# Patient Record
Sex: MALE | Race: WHITE | NOT HISPANIC OR LATINO | URBAN - NONMETROPOLITAN AREA
[De-identification: names, ages, dates, MRNs, and addresses within clinical notes are randomized per-mention and may not be internally consistent; named-entity substitution may affect disease eponyms.]

---

## 2017-08-22 ENCOUNTER — OUTSIDE FACILITY SERVICE (OUTPATIENT)
Dept: CARDIOLOGY | Facility: CLINIC | Age: 67
End: 2017-08-22

## 2017-08-22 PROCEDURE — 93306 TTE W/DOPPLER COMPLETE: CPT | Performed by: INTERNAL MEDICINE

## 2017-08-22 PROCEDURE — 99222 1ST HOSP IP/OBS MODERATE 55: CPT | Performed by: NURSE PRACTITIONER

## 2017-08-23 ENCOUNTER — OUTSIDE FACILITY SERVICE (OUTPATIENT)
Dept: CARDIOLOGY | Facility: CLINIC | Age: 67
End: 2017-08-23

## 2017-08-23 PROCEDURE — 99232 SBSQ HOSP IP/OBS MODERATE 35: CPT | Performed by: INTERNAL MEDICINE

## 2017-08-24 ENCOUNTER — OUTSIDE FACILITY SERVICE (OUTPATIENT)
Dept: CARDIOLOGY | Facility: CLINIC | Age: 67
End: 2017-08-24

## 2017-08-24 PROCEDURE — 99232 SBSQ HOSP IP/OBS MODERATE 35: CPT | Performed by: INTERNAL MEDICINE

## 2017-08-25 ENCOUNTER — OUTSIDE FACILITY SERVICE (OUTPATIENT)
Dept: CARDIOLOGY | Facility: CLINIC | Age: 67
End: 2017-08-25

## 2017-08-25 PROCEDURE — 99232 SBSQ HOSP IP/OBS MODERATE 35: CPT | Performed by: NURSE PRACTITIONER

## 2017-08-28 ENCOUNTER — OUTSIDE FACILITY SERVICE (OUTPATIENT)
Dept: CARDIOLOGY | Facility: CLINIC | Age: 67
End: 2017-08-28

## 2017-08-28 PROCEDURE — 99232 SBSQ HOSP IP/OBS MODERATE 35: CPT | Performed by: NURSE PRACTITIONER

## 2017-08-29 ENCOUNTER — OUTSIDE FACILITY SERVICE (OUTPATIENT)
Dept: CARDIOLOGY | Facility: CLINIC | Age: 67
End: 2017-08-29

## 2017-08-29 PROCEDURE — 99231 SBSQ HOSP IP/OBS SF/LOW 25: CPT | Performed by: NURSE PRACTITIONER

## 2017-08-30 ENCOUNTER — OUTSIDE FACILITY SERVICE (OUTPATIENT)
Dept: CARDIOLOGY | Facility: CLINIC | Age: 67
End: 2017-08-30

## 2017-08-30 DIAGNOSIS — I50.22 CHRONIC SYSTOLIC HEART FAILURE (HCC): Primary | ICD-10-CM

## 2017-08-30 PROCEDURE — 99231 SBSQ HOSP IP/OBS SF/LOW 25: CPT | Performed by: NURSE PRACTITIONER

## 2017-09-06 ENCOUNTER — RESULTS ENCOUNTER (OUTPATIENT)
Dept: CARDIOLOGY | Facility: CLINIC | Age: 67
End: 2017-09-06

## 2017-09-06 DIAGNOSIS — I50.22 CHRONIC SYSTOLIC HEART FAILURE (HCC): ICD-10-CM

## 2017-09-11 ENCOUNTER — OFFICE VISIT (OUTPATIENT)
Dept: CARDIOLOGY | Facility: CLINIC | Age: 67
End: 2017-09-11

## 2017-09-11 VITALS
HEART RATE: 65 BPM | HEIGHT: 72 IN | WEIGHT: 246 LBS | BODY MASS INDEX: 33.32 KG/M2 | DIASTOLIC BLOOD PRESSURE: 70 MMHG | OXYGEN SATURATION: 95 % | SYSTOLIC BLOOD PRESSURE: 120 MMHG

## 2017-09-11 DIAGNOSIS — I10 ESSENTIAL HYPERTENSION: ICD-10-CM

## 2017-09-11 DIAGNOSIS — Z91.199 PATIENT NONADHERENCE: ICD-10-CM

## 2017-09-11 DIAGNOSIS — I50.22 CHRONIC SYSTOLIC CONGESTIVE HEART FAILURE (HCC): Primary | Chronic | ICD-10-CM

## 2017-09-11 DIAGNOSIS — E83.42 HYPOMAGNESEMIA: ICD-10-CM

## 2017-09-11 DIAGNOSIS — I48.0 PAROXYSMAL ATRIAL FIBRILLATION (HCC): ICD-10-CM

## 2017-09-11 DIAGNOSIS — Z72.0 TOBACCO ABUSE: ICD-10-CM

## 2017-09-11 PROCEDURE — 99214 OFFICE O/P EST MOD 30 MIN: CPT | Performed by: NURSE PRACTITIONER

## 2017-09-11 RX ORDER — METOPROLOL SUCCINATE 50 MG/1
25 TABLET, EXTENDED RELEASE ORAL 2 TIMES DAILY
Refills: 1 | COMMUNITY
Start: 2017-06-16 | End: 2018-02-15 | Stop reason: SDUPTHER

## 2017-09-11 RX ORDER — GABAPENTIN 300 MG/1
300 CAPSULE ORAL 3 TIMES DAILY
COMMUNITY

## 2017-09-11 RX ORDER — PANTOPRAZOLE SODIUM 40 MG/1
40 TABLET, DELAYED RELEASE ORAL DAILY
Qty: 30 TABLET | Refills: 2 | Status: SHIPPED | OUTPATIENT
Start: 2017-09-11 | End: 2018-02-15

## 2017-09-11 RX ORDER — NAPROXEN SODIUM 220 MG
220 TABLET ORAL 2 TIMES DAILY WITH MEALS
COMMUNITY
End: 2017-11-29

## 2017-09-11 RX ORDER — FUROSEMIDE 40 MG/1
40 TABLET ORAL DAILY
Qty: 60 TABLET | Refills: 11
Start: 2017-09-11 | End: 2018-09-11

## 2017-09-11 RX ORDER — ACETAMINOPHEN 500 MG
500 TABLET ORAL EVERY 6 HOURS PRN
COMMUNITY

## 2017-09-11 RX ORDER — ALBUTEROL SULFATE 90 UG/1
2 AEROSOL, METERED RESPIRATORY (INHALATION) TAKE AS DIRECTED
Refills: 1 | COMMUNITY
Start: 2017-08-15

## 2017-09-11 RX ORDER — POTASSIUM CHLORIDE 750 MG/1
10 TABLET, FILM COATED, EXTENDED RELEASE ORAL 2 TIMES DAILY
Refills: 0 | COMMUNITY
Start: 2017-08-30 | End: 2020-12-28

## 2017-09-11 RX ORDER — LISINOPRIL 2.5 MG/1
1.25 TABLET ORAL DAILY
Refills: 0 | COMMUNITY
Start: 2017-08-30 | End: 2017-11-29 | Stop reason: SDUPTHER

## 2017-09-11 NOTE — PROGRESS NOTES
Subjective:     Encounter Date:09/11/2017    Chief Complaint:    Patient ID: Derek Sanchez is a 66 y.o. male here today for cardiac hospital follow-up.    HPI     Congestive Heart Failure    Additional comments: hospitalized with acute heart failure last month, starting to have more shortness of breath later in the day similar to what he had before, hasn't been wearing LifeVest since he showered due to soft alarms while trying to go to sleep and hasn't put it back on, has been having trouble with eating low sodium foods and didn't  any of his new medications for a few days after discharge, stopped all of his new medications due to stomach getting upset, wore oxygen at home for a few days           Atrial Fibrillation    Additional comments: new onset 8/2017, paroxysmal, had been anticoagulated with Eliquis since 8/22/2017 hospitalization but has only taken once since getting prescriptions filled last dose approximately 9/2/2017       Last edited by LIO Cary on 9/11/2017  2:31 PM. (History)        History:   Past Medical History:   Diagnosis Date   • Atrial fibrillation    • CHF (congestive heart failure)    • Chronic kidney disease     kidney stone   • COPD (chronic obstructive pulmonary disease)    • Diabetes mellitus    • Hypertension    • SOB (shortness of breath)      Past Surgical History:   Procedure Laterality Date   • LEG SURGERY  05/2010     Social History     Social History   • Marital status: Single     Spouse name: N/A   • Number of children: N/A   • Years of education: N/A     Occupational History   • Not on file.     Social History Main Topics   • Smoking status: Current Every Day Smoker     Packs/day: 0.25     Types: Cigarettes   • Smokeless tobacco: Never Used   • Alcohol use Yes      Comment: occ   • Drug use: No   • Sexual activity: Not on file     Other Topics Concern   • Not on file     Social History Narrative   • No narrative on file     Family History   Problem  Relation Age of Onset   • Hypertension Mother    • Irregular heart beat Mother    • Dementia Mother    • Alcohol abuse Father    • Diabetes Father        Outpatient Prescriptions Marked as Taking for the 9/11/17 encounter (Office Visit) with LIO Cary   Medication Sig Dispense Refill   • acetaminophen (TYLENOL) 500 MG tablet Take 500 mg by mouth Every 6 (Six) Hours As Needed for Mild Pain .     • aspirin 81 MG tablet Take 81 mg by mouth Every Night.     • gabapentin (NEURONTIN) 300 MG capsule Take 300 mg by mouth 3 (Three) Times a Day.     • lisinopril (PRINIVIL,ZESTRIL) 2.5 MG tablet Take 1.25 mg by mouth Daily. 1/2 tablet daily  0   • metFORMIN (GLUCOPHAGE) 500 MG tablet Take 250 mg by mouth 2 (Two) Times a Day With Meals.     • metoprolol succinate XL (TOPROL-XL) 50 MG 24 hr tablet Take 25 mg by mouth 2 (Two) Times a Day.  1   • naproxen sodium (ALEVE) 220 MG tablet Take 220 mg by mouth 2 (Two) Times a Day With Meals.     • VENTOLIN  (90 Base) MCG/ACT inhaler Inhale 2 puffs Take As Directed.  1   Hasn't been taking Eliquis, furosemide, potassium, Celexa, MagOx, or Breo Ellipta as prescribed at hospital discharge 8/31/17. Restarted Aleve on his own.       Review of Systems:  Review of Systems   Constitution: Positive for decreased appetite, malaise/fatigue and weight gain (hasn't been weighing at home - doesn't have a set of scales, weighed 222 lbs at discharge 8/31/17). Negative for chills and fever.   HENT: Positive for congestion. Negative for headaches and hoarse voice.    Eyes: Negative for blurred vision (wears glasses) and double vision.   Cardiovascular: Positive for dyspnea on exertion, leg swelling ( has stayed down since being diuresed), orthopnea and paroxysmal nocturnal dyspnea. Negative for chest pain, irregular heartbeat and palpitations.   Respiratory: Positive for shortness of breath. Negative for cough.    Endocrine: Positive for heat intolerance. Negative for cold  "intolerance.   Hematologic/Lymphatic: Bruises/bleeds easily.   Skin: Negative for dry skin, itching and rash.   Musculoskeletal: Positive for back pain and muscle cramps. Negative for joint pain and neck pain.   Gastrointestinal: Positive for abdominal pain, constipation and diarrhea. Negative for heartburn and melena.   Genitourinary: Negative for dysuria, frequency and hematuria.   Neurological: Positive for dizziness, light-headedness and loss of balance.   Psychiatric/Behavioral: Negative for depression. The patient has insomnia. The patient is not nervous/anxious.           Objective:   /70 (BP Location: Left arm, Patient Position: Sitting, Cuff Size: Large Adult)  Pulse 65  Ht 72\" (182.9 cm)  Wt 246 lb (112 kg)  SpO2 95%  BMI 33.36 kg/m2  Wt Readings from Last 3 Encounters:   09/11/17 246 lb (112 kg)       Physical Exam   Constitutional: He is oriented to person, place, and time. He appears well-developed and well-nourished.   Eyes: No scleral icterus.   Neck: No JVD present.   Cardiovascular: Normal rate, regular rhythm, normal heart sounds and intact distal pulses.  Exam reveals no gallop and no friction rub.    No murmur heard.  Pulmonary/Chest: Effort normal and breath sounds normal.   Musculoskeletal: He exhibits edema (2+ pitting BLEs to below knee).   Neurological: He is alert and oriented to person, place, and time.   Skin: Skin is warm and dry.   Psychiatric: He has a normal mood and affect.   Vitals reviewed.      Lab/Diagnostics Review:   8/28/2017   CBC WBC 7.8, Hgb 16, Hct 49.7, plt 157,000  BMP Na 139, K 3.7, Cl 95, CO2 43.3, BUN 39, Cr 1.07, Ca 9.1, glucose 88    8/22/2017 2D Echocardiogram  EF 25-30%, mild MR, mild RVE, mild BEATRIZ, moderate TR, mild PI, RVSP 50-55 mm Hg     8/21/2017 CXR moderate right pleural effusion, opacifications in RML and RLL which may represent a combination of atelectasis and pneumonia. Mild cardiomegaly.     8/21/2017   Mg 1.7  NT pro BnP 5837  TSH " 2.664    8/21/2017 EKG atrial fibrillation 100 bpm, marked LAD, LBBB    Procedures: none in office today        Assessment/Plan:         Derek was seen today for congestive heart failure and atrial fibrillation.    Diagnoses and all orders for this visit:    Chronic systolic congestive heart failure  Comments:  Class III, Stage C. Restart furosemide, potassium, and MagOx. He doesn't want to do heart cath - explained importance of ruling out CAD.   Orders:  -     furosemide (LASIX) 40 MG tablet; Take 1 tablet by mouth Daily. With an extra tab as needed  -     Basic Metabolic Panel; Future    Paroxysmal atrial fibrillation  Comments:  restart Eliquis, explained risk of stroke without anticoagulation    Hypomagnesemia  Comments:  Restart MagOx - explained importance given weak heart muscle  Orders:  -     magnesium oxide (MAGOX 400) 400 (241.3 Mg) MG tablet tablet; Take 1 tablet by mouth 2 (Two) Times a Day.    Essential hypertension  Comments:  well controlled    Patient nonadherence  Comments:  encouraged adherence, discussed importance of medications, some socioeconomic barriers    Tobacco abuse  Comments:  will discuss smoking cessation in the future after deal with acute problems    Other orders  -     pantoprazole (PROTONIX) 40 MG EC tablet; Take 1 tablet by mouth Daily.    Resume furosemide 40 mg daily (with extra tab as needed), potassium 10 mEq 2 times per day, Eliquis 5 mg 2 times per day, and MagOx 400 mg 2 times per day. Discussed importance of medications and relationship to fluid, breathing, worsening of his heart function, etc.     Call if wants to proceed with Lexiscan stress test or cardiac catheterization. Will only consider cardioversion if he complies with Eliquis.     He is fearful about having the same reaction to an anesthesia or pain medication he had in the past at [facility] where he had black outs and was combative.  Reassured him we would make sure he didn't receive the same medication, but  couldn't guarantee he wouldn't have a similar reaction to other medications.     Will have him check BMP in 1 week since he hasn't been taking his medications but is now agreeable.     Return in about 1 month (around 10/11/2017) for Heart Failure Clinic. Sooner if needed.    Encouraged him to call if has any problems or questions with symptoms or medications.            LIO Louise, ACNP-BC, CHFN-BC

## 2017-09-18 ENCOUNTER — RESULTS ENCOUNTER (OUTPATIENT)
Dept: CARDIOLOGY | Facility: CLINIC | Age: 67
End: 2017-09-18

## 2017-09-18 DIAGNOSIS — I50.22 CHRONIC SYSTOLIC CONGESTIVE HEART FAILURE (HCC): Chronic | ICD-10-CM

## 2017-11-09 ENCOUNTER — OUTSIDE FACILITY SERVICE (OUTPATIENT)
Dept: CARDIOLOGY | Facility: CLINIC | Age: 67
End: 2017-11-09

## 2017-11-09 PROCEDURE — 99223 1ST HOSP IP/OBS HIGH 75: CPT | Performed by: INTERNAL MEDICINE

## 2017-11-09 PROCEDURE — 93306 TTE W/DOPPLER COMPLETE: CPT | Performed by: INTERNAL MEDICINE

## 2017-11-10 ENCOUNTER — OUTSIDE FACILITY SERVICE (OUTPATIENT)
Dept: CARDIOLOGY | Facility: CLINIC | Age: 67
End: 2017-11-10

## 2017-11-10 PROCEDURE — 99232 SBSQ HOSP IP/OBS MODERATE 35: CPT | Performed by: NURSE PRACTITIONER

## 2017-11-13 ENCOUNTER — OUTSIDE FACILITY SERVICE (OUTPATIENT)
Dept: CARDIOLOGY | Facility: CLINIC | Age: 67
End: 2017-11-13

## 2017-11-13 PROCEDURE — 99232 SBSQ HOSP IP/OBS MODERATE 35: CPT | Performed by: NURSE PRACTITIONER

## 2017-11-14 ENCOUNTER — OUTSIDE FACILITY SERVICE (OUTPATIENT)
Dept: CARDIOLOGY | Facility: CLINIC | Age: 67
End: 2017-11-14

## 2017-11-14 PROCEDURE — 99232 SBSQ HOSP IP/OBS MODERATE 35: CPT | Performed by: NURSE PRACTITIONER

## 2017-11-15 ENCOUNTER — OUTSIDE FACILITY SERVICE (OUTPATIENT)
Dept: CARDIOLOGY | Facility: CLINIC | Age: 67
End: 2017-11-15

## 2017-11-15 PROCEDURE — 99232 SBSQ HOSP IP/OBS MODERATE 35: CPT | Performed by: INTERNAL MEDICINE

## 2017-11-16 ENCOUNTER — OUTSIDE FACILITY SERVICE (OUTPATIENT)
Dept: CARDIOLOGY | Facility: CLINIC | Age: 67
End: 2017-11-16

## 2017-11-16 PROCEDURE — 99231 SBSQ HOSP IP/OBS SF/LOW 25: CPT | Performed by: INTERNAL MEDICINE

## 2017-11-20 ENCOUNTER — TELEPHONE (OUTPATIENT)
Dept: CARDIOLOGY | Facility: CLINIC | Age: 67
End: 2017-11-20

## 2017-11-20 NOTE — TELEPHONE ENCOUNTER
"Returned pt's call - he is questioning taking spironolactone and citalopram.  Advised him to keep taking spironolactone along with furosemide.  Swelling improving.  Weighing daily and has lost 3 lbs since discharge.  Home Health following.    He is to call [facility] for refills on citalopram - has appt to see Dr. Fowler 12/21/17.      Advised to watch \"dark skin\" on toe - almost black.  Hasn't changed in color or size since in the hospital but he doesn't know for sure.  Call Dr. Potts if worsens.  Will see Dr. Potts 11/28 and Dr. Menjivar 11/29.    "

## 2017-11-29 ENCOUNTER — OFFICE VISIT (OUTPATIENT)
Dept: CARDIOLOGY | Facility: CLINIC | Age: 67
End: 2017-11-29

## 2017-11-29 VITALS
HEART RATE: 62 BPM | HEIGHT: 72 IN | DIASTOLIC BLOOD PRESSURE: 70 MMHG | SYSTOLIC BLOOD PRESSURE: 120 MMHG | BODY MASS INDEX: 31.15 KG/M2 | OXYGEN SATURATION: 94 % | WEIGHT: 230 LBS

## 2017-11-29 DIAGNOSIS — I48.19 PERSISTENT ATRIAL FIBRILLATION (HCC): ICD-10-CM

## 2017-11-29 DIAGNOSIS — I10 ESSENTIAL HYPERTENSION: ICD-10-CM

## 2017-11-29 DIAGNOSIS — Z72.0 TOBACCO ABUSE: ICD-10-CM

## 2017-11-29 DIAGNOSIS — I50.22 CHRONIC SYSTOLIC CONGESTIVE HEART FAILURE (HCC): Primary | ICD-10-CM

## 2017-11-29 DIAGNOSIS — Z91.199 NONCOMPLIANCE: ICD-10-CM

## 2017-11-29 PROCEDURE — 99214 OFFICE O/P EST MOD 30 MIN: CPT | Performed by: INTERNAL MEDICINE

## 2017-11-29 RX ORDER — CITALOPRAM 10 MG/1
10 TABLET ORAL DAILY
Refills: 2 | COMMUNITY
Start: 2017-11-21

## 2017-11-29 RX ORDER — HYDROCODONE BITARTRATE AND ACETAMINOPHEN 7.5; 325 MG/1; MG/1
7.5-325 TABLET ORAL EVERY 6 HOURS PRN
Refills: 0 | COMMUNITY
Start: 2017-11-18 | End: 2020-07-22

## 2017-11-29 RX ORDER — SPIRONOLACTONE 25 MG/1
25 TABLET ORAL DAILY
Refills: 3 | COMMUNITY
Start: 2017-11-18 | End: 2019-12-23 | Stop reason: SDUPTHER

## 2017-11-29 RX ORDER — LISINOPRIL 2.5 MG/1
2.5 TABLET ORAL DAILY
Qty: 30 TABLET | Refills: 11 | Status: SHIPPED | OUTPATIENT
Start: 2017-11-29 | End: 2018-05-16 | Stop reason: SDUPTHER

## 2017-11-29 NOTE — PROGRESS NOTES
Reason for Visit: cardiovascular follow up.    HPI:  Derek Sanchez is a 67 y.o. male is here today for follow-up.  He was recently admitted to [facility] with acute on chronic congestive heart failure and leg ulcer.  Noncompliance was felt to be a major part of the patient's decompensated heart failure.  He has been compliant with his medications since discharge.  Feels like he is doing somewhat better.  Breathing has improved and swelling is at baseline.  Home health has been coming to his house.  He continues to smoke about 1/4 PPD.      Previous Cardiac Testing and Procedures:  - Echo (08/22/2017) EF 25-30%, mild MR, mild RV dilation, mild RA dilation, moderate TR, mild PI, RVSP 50-55 mmHg  - EKG (08/21/2017) atrial fibrillation with heart rate 100 bpm, left axis deviation, left bundle branch block  - Echo (11/09/2017) EF 20-25%, moderate RV dilatation, moderate reduced RV function, mild AI and MR, moderate TR, mild to moderate PI, RVSP 40-45 mmHg    Patient Active Problem List   Diagnosis   • Atrial fibrillation   • Hypertension   • COPD (chronic obstructive pulmonary disease)   • SOB (shortness of breath)   • CHF (congestive heart failure)   • Hypomagnesemia   • Tobacco abuse       Social History   Substance Use Topics   • Smoking status: Current Every Day Smoker     Packs/day: 0.25     Types: Cigarettes   • Smokeless tobacco: Never Used   • Alcohol use Yes      Comment: occ       Family History   Problem Relation Age of Onset   • Hypertension Mother    • Irregular heart beat Mother    • Dementia Mother    • Alcohol abuse Father    • Diabetes Father        The following portions of the patient's history were reviewed and updated as appropriate: allergies, current medications, past family history, past medical history, past social history, past surgical history and problem list.      Current Outpatient Prescriptions:   •  acetaminophen (TYLENOL) 500 MG tablet, Take 500 mg by mouth Every 6  (Six) Hours As Needed for Mild Pain ., Disp: , Rfl:   •  apixaban (ELIQUIS) 5 MG tablet tablet, Take 1 tablet by mouth 2 (Two) Times a Day., Disp: 60 tablet, Rfl: 11  •  aspirin 81 MG tablet, Take 81 mg by mouth Every Night., Disp: , Rfl:   •  BREO ELLIPTA 100-25 MCG/INH aerosol powder , Inhale 100 mcg Daily., Disp: , Rfl: 1  •  citalopram (CeleXA) 10 MG tablet, Take 10 mg by mouth Daily., Disp: , Rfl: 2  •  furosemide (LASIX) 40 MG tablet, Take 1 tablet by mouth Daily. With an extra tab as needed (Patient taking differently: Take 40 mg by mouth 2 (Two) Times a Day. With an extra tab as needed), Disp: 60 tablet, Rfl: 11  •  gabapentin (NEURONTIN) 300 MG capsule, Take 300 mg by mouth 3 (Three) Times a Day., Disp: , Rfl:   •  HYDROcodone-acetaminophen (NORCO) 7.5-325 MG per tablet, Take 7.5-325 tablets by mouth Every 6 (Six) Hours As Needed., Disp: , Rfl: 0  •  KLOR-CON 10 MEQ CR tablet, Take 10 mEq by mouth 2 (Two) Times a Day., Disp: , Rfl: 0  •  lisinopril (PRINIVIL,ZESTRIL) 2.5 MG tablet, Take 1 tablet by mouth Daily. 1/2 tablet daily, Disp: 30 tablet, Rfl: 11  •  magnesium oxide (MAGOX 400) 400 (241.3 Mg) MG tablet tablet, Take 1 tablet by mouth 2 (Two) Times a Day., Disp: 60 tablet, Rfl: 11  •  metFORMIN (GLUCOPHAGE) 500 MG tablet, Take 250 mg by mouth 2 (Two) Times a Day With Meals., Disp: , Rfl:   •  metoprolol succinate XL (TOPROL-XL) 50 MG 24 hr tablet, Take 25 mg by mouth 2 (Two) Times a Day., Disp: , Rfl: 1  •  pantoprazole (PROTONIX) 40 MG EC tablet, Take 1 tablet by mouth Daily., Disp: 30 tablet, Rfl: 2  •  silver sulfadiazine (SILVADENE, SSD) 1 % cream, Apply 1 g topically Daily., Disp: , Rfl: 3  •  spironolactone (ALDACTONE) 25 MG tablet, Take 25 mg by mouth Daily., Disp: , Rfl: 3  •  VENTOLIN  (90 Base) MCG/ACT inhaler, Inhale 2 puffs Take As Directed., Disp: , Rfl: 1    Review of Systems   Constitution: Positive for decreased appetite, malaise/fatigue and weight gain (hasn't been weighing at  "home - doesn't have a set of scales, weighed 222 lbs at discharge 8/31/17). Negative for chills and fever.   HENT: Positive for congestion. Negative for hoarse voice.    Eyes: Negative for blurred vision (wears glasses) and double vision.   Cardiovascular: Positive for dyspnea on exertion, leg swelling ( has stayed down since being diuresed), orthopnea and paroxysmal nocturnal dyspnea. Negative for chest pain, irregular heartbeat and palpitations.   Respiratory: Positive for shortness of breath. Negative for cough.    Endocrine: Positive for heat intolerance. Negative for cold intolerance.   Hematologic/Lymphatic: Bruises/bleeds easily.   Skin: Negative for dry skin, itching and rash.   Musculoskeletal: Positive for back pain and muscle cramps. Negative for joint pain and neck pain.   Gastrointestinal: Positive for abdominal pain, constipation and diarrhea. Negative for heartburn and melena.   Genitourinary: Negative for dysuria, frequency and hematuria.   Neurological: Positive for dizziness, light-headedness and loss of balance. Negative for headaches.   Psychiatric/Behavioral: Negative for depression. The patient has insomnia. The patient is not nervous/anxious.        Objective   /70 (BP Location: Left arm, Patient Position: Sitting, Cuff Size: Adult)  Pulse 62  Ht 72\" (182.9 cm)  Wt 230 lb (104 kg)  SpO2 94%  BMI 31.19 kg/m2  Physical Exam   Constitutional: He is oriented to person, place, and time. He appears well-developed and well-nourished.   HENT:   Head: Normocephalic and atraumatic.   Cardiovascular: Normal rate and normal heart sounds.  An irregularly irregular rhythm present.   No murmur heard.  Pulmonary/Chest: Effort normal and breath sounds normal.   Musculoskeletal: He exhibits edema.   Neurological: He is alert and oriented to person, place, and time.   Skin: Skin is warm and dry.   Psychiatric: He has a normal mood and affect.     Procedures      ICD-10-CM ICD-9-CM   1. Chronic " systolic congestive heart failure I50.22 428.22     428.0   2. Persistent atrial fibrillation I48.1 427.31   3. Essential hypertension I10 401.9   4. Tobacco abuse Z72.0 305.1   5. Noncompliance Z91.19 V15.81         Assessment/Plan:  1. Chronic systolic congestive heart failure: Recent inpatient admission for acute decompensation.  EF 20-25%.  Noncompliance is felt to be playing a role.  Patient had previously refused any form of ischemic workup.  Titrate up lisinopril to 2.5 mg.  Continue metoprolol succinate, spironolactone, and furosemide.  Recommend to avoid NSAIDS.  Has repeat  BMP scheduled with Dr Fowler at follow up.      2.  Atrial fibrillation: Rate controlled on metoprolol and on anticoagulation with Eliquis.    3.  Essential hypertension: Blood pressure is acceptable today.    4.  Tobacco abuse:  on the importance of smoking cessation and options for quitting.    5.  Noncompliance:  on the importance of taking all medicines and keep all follow-up appointments.  Also counseled patient on the dangers and risks associated with noncompliance.

## 2018-01-05 RX ORDER — PANTOPRAZOLE SODIUM 40 MG/1
40 TABLET, DELAYED RELEASE ORAL DAILY
Qty: 30 TABLET | Refills: 11 | OUTPATIENT
Start: 2018-01-05 | End: 2019-01-05

## 2018-01-05 NOTE — TELEPHONE ENCOUNTER
I gave him a few months of protonix in Sept but needs to disuss with PCP if feels like needs further refills

## 2018-01-31 RX ORDER — PANTOPRAZOLE SODIUM 40 MG/1
40 TABLET, DELAYED RELEASE ORAL DAILY
Qty: 30 TABLET | Refills: 11 | OUTPATIENT
Start: 2018-01-31 | End: 2019-01-31

## 2018-01-31 NOTE — TELEPHONE ENCOUNTER
He needs to check with Dr. Fowler about whether or not to continue Protonix.  I refilled Eliquis.  He may need prior auth or pt assistance. Ok to get samples until we know his coverage if needed.

## 2018-01-31 NOTE — TELEPHONE ENCOUNTER
NEEDS REFILLS ON PANTOPRAZOLE 40MG.  HE IS ALSO LOW ON ELIQUIS AND IS NEEDING EITHER A SCRIPT FOR IT OR MORE SAMPLES.

## 2018-02-15 ENCOUNTER — OFFICE VISIT (OUTPATIENT)
Dept: CARDIOLOGY | Facility: CLINIC | Age: 68
End: 2018-02-15

## 2018-02-15 VITALS
WEIGHT: 230 LBS | BODY MASS INDEX: 31.15 KG/M2 | HEIGHT: 72 IN | SYSTOLIC BLOOD PRESSURE: 116 MMHG | OXYGEN SATURATION: 95 % | DIASTOLIC BLOOD PRESSURE: 80 MMHG | HEART RATE: 95 BPM

## 2018-02-15 DIAGNOSIS — E66.09 CLASS 1 OBESITY DUE TO EXCESS CALORIES WITH SERIOUS COMORBIDITY AND BODY MASS INDEX (BMI) OF 31.0 TO 31.9 IN ADULT: ICD-10-CM

## 2018-02-15 DIAGNOSIS — I10 ESSENTIAL HYPERTENSION: ICD-10-CM

## 2018-02-15 DIAGNOSIS — I50.22 CHRONIC SYSTOLIC CONGESTIVE HEART FAILURE (HCC): Primary | ICD-10-CM

## 2018-02-15 DIAGNOSIS — I48.19 PERSISTENT ATRIAL FIBRILLATION (HCC): ICD-10-CM

## 2018-02-15 DIAGNOSIS — Z72.0 TOBACCO ABUSE: ICD-10-CM

## 2018-02-15 PROCEDURE — 99214 OFFICE O/P EST MOD 30 MIN: CPT | Performed by: INTERNAL MEDICINE

## 2018-02-15 RX ORDER — METOPROLOL SUCCINATE 50 MG/1
50 TABLET, EXTENDED RELEASE ORAL DAILY
Qty: 30 TABLET | Refills: 11 | Status: SHIPPED | OUTPATIENT
Start: 2018-02-15 | End: 2019-03-14 | Stop reason: SDUPTHER

## 2018-02-15 NOTE — PROGRESS NOTES
Reason for Visit: cardiovascular follow up.    HPI:  Derek Sanchez is a 67 y.o. male is here today for follow-up.  His weight has been stable.  Has not noticed any significant changes.  He denies any palpitations, dizziness, chest pain, PND, or orthopnea.  His swelling has improved.  Home health is no longer coming to his house.  He continues to smoke about a half PPD and is not interested in quitting.  He stopped taking the pantoprazole and has not noticed any problems and does not want to take it further.      Previous Cardiac Testing and Procedures:  - Echo (08/22/2017) EF 25-30%, mild MR, mild RV dilation, mild RA dilation, moderate TR, mild PI, RVSP 50-55 mmHg  - EKG (08/21/2017) atrial fibrillation with heart rate 100 bpm, left axis deviation, left bundle branch block  - Echo (11/09/2017) EF 20-25%, moderate RV dilatation, moderate reduced RV function, mild AI and MR, moderate TR, mild to moderate PI, RVSP 40-45 mmHg    Patient Active Problem List   Diagnosis   • Atrial fibrillation   • Hypertension   • COPD (chronic obstructive pulmonary disease)   • SOB (shortness of breath)   • CHF (congestive heart failure)   • Hypomagnesemia   • Tobacco abuse       Social History   Substance Use Topics   • Smoking status: Current Every Day Smoker     Packs/day: 0.50     Types: Cigarettes   • Smokeless tobacco: Never Used   • Alcohol use Yes      Comment: occ       Family History   Problem Relation Age of Onset   • Hypertension Mother    • Irregular heart beat Mother    • Dementia Mother    • Alcohol abuse Father    • Diabetes Father        The following portions of the patient's history were reviewed and updated as appropriate: allergies, current medications, past family history, past medical history, past social history, past surgical history and problem list.      Current Outpatient Prescriptions:   •  acetaminophen (TYLENOL) 500 MG tablet, Take 500 mg by mouth Every 6 (Six) Hours As Needed for Mild Pain ., Disp: ,  Rfl:   •  apixaban (ELIQUIS) 5 MG tablet tablet, Take 1 tablet by mouth 2 (Two) Times a Day., Disp: 60 tablet, Rfl: 11  •  aspirin 81 MG tablet, Take 81 mg by mouth Every Night., Disp: , Rfl:   •  BREO ELLIPTA 100-25 MCG/INH aerosol powder , Inhale 100 mcg Daily., Disp: , Rfl: 1  •  citalopram (CeleXA) 10 MG tablet, Take 10 mg by mouth Daily., Disp: , Rfl: 2  •  furosemide (LASIX) 40 MG tablet, Take 1 tablet by mouth Daily. With an extra tab as needed (Patient taking differently: Take 40 mg by mouth 2 (Two) Times a Day. With an extra tab as needed), Disp: 60 tablet, Rfl: 11  •  gabapentin (NEURONTIN) 300 MG capsule, Take 300 mg by mouth 3 (Three) Times a Day., Disp: , Rfl:   •  HYDROcodone-acetaminophen (NORCO) 7.5-325 MG per tablet, Take 7.5-325 tablets by mouth Every 6 (Six) Hours As Needed., Disp: , Rfl: 0  •  KLOR-CON 10 MEQ CR tablet, Take 10 mEq by mouth 2 (Two) Times a Day., Disp: , Rfl: 0  •  lisinopril (PRINIVIL,ZESTRIL) 2.5 MG tablet, Take 1 tablet by mouth Daily. 1/2 tablet daily, Disp: 30 tablet, Rfl: 11  •  magnesium oxide (MAGOX 400) 400 (241.3 Mg) MG tablet tablet, Take 1 tablet by mouth 2 (Two) Times a Day., Disp: 60 tablet, Rfl: 11  •  metFORMIN (GLUCOPHAGE) 500 MG tablet, Take 250 mg by mouth 2 (Two) Times a Day With Meals., Disp: , Rfl:   •  metoprolol succinate XL (TOPROL-XL) 50 MG 24 hr tablet, Take 1 tablet by mouth Daily., Disp: 30 tablet, Rfl: 11  •  silver sulfadiazine (SILVADENE, SSD) 1 % cream, Apply 1 g topically Daily., Disp: , Rfl: 3  •  spironolactone (ALDACTONE) 25 MG tablet, Take 25 mg by mouth Daily., Disp: , Rfl: 3  •  VENTOLIN  (90 Base) MCG/ACT inhaler, Inhale 2 puffs Take As Directed., Disp: , Rfl: 1    Review of Systems   Constitution: Positive for decreased appetite, malaise/fatigue and weight gain (hasn't been weighing at home - doesn't have a set of scales, weighed 222 lbs at discharge 8/31/17). Negative for chills and fever.   HENT: Positive for congestion. Negative  "for hoarse voice.    Eyes: Negative for blurred vision (wears glasses) and double vision.   Cardiovascular: Positive for dyspnea on exertion, leg swelling ( has stayed down since being diuresed), orthopnea and paroxysmal nocturnal dyspnea. Negative for chest pain, irregular heartbeat and palpitations.   Respiratory: Positive for shortness of breath. Negative for cough.    Endocrine: Positive for heat intolerance. Negative for cold intolerance.   Hematologic/Lymphatic: Bruises/bleeds easily.   Skin: Negative for dry skin, itching and rash.   Musculoskeletal: Positive for back pain and muscle cramps. Negative for joint pain and neck pain.   Gastrointestinal: Positive for abdominal pain, constipation and diarrhea. Negative for heartburn and melena.   Genitourinary: Negative for dysuria, frequency and hematuria.   Neurological: Positive for dizziness, light-headedness and loss of balance. Negative for headaches.   Psychiatric/Behavioral: Negative for depression. The patient has insomnia. The patient is not nervous/anxious.        Objective   /80 (BP Location: Left arm, Patient Position: Sitting, Cuff Size: Adult)  Pulse 95  Ht 182.9 cm (72\")  Wt 104 kg (230 lb)  SpO2 95%  BMI 31.19 kg/m2  Physical Exam   Constitutional: He is oriented to person, place, and time. He appears well-developed and well-nourished.   HENT:   Head: Normocephalic and atraumatic.   Cardiovascular: Normal rate and normal heart sounds.  An irregular rhythm present.   No murmur heard.  Pulmonary/Chest: Effort normal and breath sounds normal.   Musculoskeletal: He exhibits no edema.   Neurological: He is alert and oriented to person, place, and time.   Skin: Skin is warm and dry.   Psychiatric: He has a normal mood and affect.     Procedures      ICD-10-CM ICD-9-CM   1. Chronic systolic congestive heart failure I50.22 428.22     428.0   2. Persistent atrial fibrillation I48.1 427.31   3. Essential hypertension I10 401.9   4. Tobacco abuse " Z72.0 305.1   5. Class 1 obesity due to excess calories with serious comorbidity and body mass index (BMI) of 31.0 to 31.9 in adult E66.09 278.00    Z68.31 V85.31         Assessment/Plan:  1. Chronic systolic congestive heart failure: Recent inpatient admission for acute decompensationNovember 2017.  EF 20-25%.   patient continues to refuse any form of ischemic testing or invasive treatments.  He understands there could be a potential morbidity and mortality benefit.   continue lisinopril, furosemide, and spironolactone.  BMP followed regularly by PCP.  Will titrate up metoprolol to 50 mg.       2.  Atrial fibrillation: Will titrate up metoprolol to 50 mg due to heart rate in the upper 90s today.  Continue anticoagulation with Eliquis.     3.  Essential hypertension: Blood pressure remains reasonably well-controlled.     4.  Tobacco abuse: Continues to smoke approximately one half pack per day and has no interest or consideration in quitting.     5.   Obesity: BMI 31.   on diet, exercise, and weight loss.    6.  Noncompliance: Has history of noncompliance recently notes he has been taking his medications and doing well.  Continue to encourage regular compliance.

## 2018-05-16 ENCOUNTER — OFFICE VISIT (OUTPATIENT)
Dept: CARDIOLOGY | Facility: CLINIC | Age: 68
End: 2018-05-16

## 2018-05-16 VITALS
DIASTOLIC BLOOD PRESSURE: 80 MMHG | OXYGEN SATURATION: 89 % | BODY MASS INDEX: 30.2 KG/M2 | HEIGHT: 72 IN | SYSTOLIC BLOOD PRESSURE: 102 MMHG | HEART RATE: 50 BPM | WEIGHT: 223 LBS

## 2018-05-16 DIAGNOSIS — Z72.0 TOBACCO ABUSE: ICD-10-CM

## 2018-05-16 DIAGNOSIS — I10 ESSENTIAL HYPERTENSION: ICD-10-CM

## 2018-05-16 DIAGNOSIS — I48.19 PERSISTENT ATRIAL FIBRILLATION (HCC): ICD-10-CM

## 2018-05-16 DIAGNOSIS — I50.22 CHRONIC SYSTOLIC CONGESTIVE HEART FAILURE (HCC): Primary | ICD-10-CM

## 2018-05-16 DIAGNOSIS — E66.09 CLASS 1 OBESITY DUE TO EXCESS CALORIES WITH SERIOUS COMORBIDITY AND BODY MASS INDEX (BMI) OF 30.0 TO 30.9 IN ADULT: ICD-10-CM

## 2018-05-16 PROCEDURE — 93000 ELECTROCARDIOGRAM COMPLETE: CPT | Performed by: INTERNAL MEDICINE

## 2018-05-16 PROCEDURE — 99406 BEHAV CHNG SMOKING 3-10 MIN: CPT | Performed by: INTERNAL MEDICINE

## 2018-05-16 PROCEDURE — 99214 OFFICE O/P EST MOD 30 MIN: CPT | Performed by: INTERNAL MEDICINE

## 2018-05-16 RX ORDER — LISINOPRIL 5 MG/1
5 TABLET ORAL DAILY
Qty: 30 TABLET | Refills: 11 | Status: SHIPPED | OUTPATIENT
Start: 2018-05-16 | End: 2018-08-22 | Stop reason: SDUPTHER

## 2018-05-16 NOTE — PROGRESS NOTES
"  Reason for Visit: cardiovascular follow up.    HPI:  Derek Sanchez is a 67 y.o. male is here today for follow-up.  He has been doing relatively well from a cardiac standpoint.  Denies any chest pain.  Breathing is at baseline.  Continues to have some lower extremity edema.  Has been taking his Lasix twice daily.  Has been compliant with his other medications.  Has not worn compression stockings.  Does not check his blood pressure at home.  Continues to smoke and is not interested in quitting at this time.  \"It is all I got\".    Previous Cardiac Testing and Procedures:  - Echo (08/22/2017) EF 25-30%, mild MR, mild RV dilation, mild RA dilation, moderate TR, mild PI, RVSP 50-55 mmHg  - EKG (08/21/2017) atrial fibrillation with heart rate 100 bpm, left axis deviation, left bundle branch block  - Echo (11/09/2017) EF 20-25%, moderate RV dilatation, moderate reduced RV function, mild AI and MR, moderate TR, mild to moderate PI, RVSP 40-45 mmHg    Patient Active Problem List   Diagnosis   • Atrial fibrillation   • Hypertension   • COPD (chronic obstructive pulmonary disease)   • SOB (shortness of breath)   • CHF (congestive heart failure)   • Hypomagnesemia   • Tobacco abuse       Social History   Substance Use Topics   • Smoking status: Current Every Day Smoker     Packs/day: 0.50     Types: Cigarettes   • Smokeless tobacco: Never Used   • Alcohol use Yes      Comment: occ       Family History   Problem Relation Age of Onset   • Hypertension Mother    • Irregular heart beat Mother    • Dementia Mother    • Alcohol abuse Father    • Diabetes Father        The following portions of the patient's history were reviewed and updated as appropriate: allergies, current medications, past family history, past medical history, past social history, past surgical history and problem list.      Current Outpatient Prescriptions:   •  acetaminophen (TYLENOL) 500 MG tablet, Take 500 mg by mouth Every 6 (Six) Hours As Needed for Mild " Pain ., Disp: , Rfl:   •  apixaban (ELIQUIS) 5 MG tablet tablet, Take 1 tablet by mouth 2 (Two) Times a Day., Disp: 60 tablet, Rfl: 11  •  aspirin 81 MG tablet, Take 81 mg by mouth Every Night., Disp: , Rfl:   •  BREO ELLIPTA 100-25 MCG/INH aerosol powder , Inhale 100 mcg Daily., Disp: , Rfl: 1  •  citalopram (CeleXA) 10 MG tablet, Take 10 mg by mouth Daily., Disp: , Rfl: 2  •  furosemide (LASIX) 40 MG tablet, Take 1 tablet by mouth Daily. With an extra tab as needed (Patient taking differently: Take 40 mg by mouth 2 (Two) Times a Day. With an extra tab as needed), Disp: 60 tablet, Rfl: 11  •  gabapentin (NEURONTIN) 300 MG capsule, Take 300 mg by mouth 3 (Three) Times a Day., Disp: , Rfl:   •  HYDROcodone-acetaminophen (NORCO) 7.5-325 MG per tablet, Take 7.5-325 tablets by mouth Every 6 (Six) Hours As Needed., Disp: , Rfl: 0  •  KLOR-CON 10 MEQ CR tablet, Take 10 mEq by mouth 2 (Two) Times a Day., Disp: , Rfl: 0  •  lisinopril (PRINIVIL,ZESTRIL) 5 MG tablet, Take 1 tablet by mouth Daily. 1/2 tablet daily, Disp: 30 tablet, Rfl: 11  •  magnesium oxide (MAGOX 400) 400 (241.3 Mg) MG tablet tablet, Take 1 tablet by mouth 2 (Two) Times a Day., Disp: 60 tablet, Rfl: 11  •  metFORMIN (GLUCOPHAGE) 500 MG tablet, Take 250 mg by mouth 2 (Two) Times a Day With Meals., Disp: , Rfl:   •  metoprolol succinate XL (TOPROL-XL) 50 MG 24 hr tablet, Take 1 tablet by mouth Daily., Disp: 30 tablet, Rfl: 11  •  silver sulfadiazine (SILVADENE, SSD) 1 % cream, Apply 1 g topically Daily., Disp: , Rfl: 3  •  spironolactone (ALDACTONE) 25 MG tablet, Take 25 mg by mouth Daily., Disp: , Rfl: 3  •  VENTOLIN  (90 Base) MCG/ACT inhaler, Inhale 2 puffs Take As Directed., Disp: , Rfl: 1    Review of Systems   Constitution: Positive for decreased appetite, malaise/fatigue and weight gain (hasn't been weighing at home - doesn't have a set of scales, weighed 222 lbs at discharge 8/31/17). Negative for chills and fever.   HENT: Positive for  "congestion. Negative for hoarse voice.    Eyes: Negative for blurred vision (wears glasses) and double vision.   Cardiovascular: Positive for dyspnea on exertion, leg swelling ( has stayed down since being diuresed), orthopnea and paroxysmal nocturnal dyspnea. Negative for chest pain, irregular heartbeat and palpitations.   Respiratory: Negative for cough and shortness of breath.    Endocrine: Positive for heat intolerance. Negative for cold intolerance.   Hematologic/Lymphatic: Bruises/bleeds easily.   Skin: Negative for dry skin, itching and rash.   Musculoskeletal: Positive for back pain and muscle cramps. Negative for joint pain and neck pain.   Gastrointestinal: Positive for abdominal pain, constipation and diarrhea. Negative for heartburn and melena.   Genitourinary: Negative for dysuria, frequency and hematuria.   Neurological: Positive for loss of balance. Negative for dizziness, headaches and light-headedness.   Psychiatric/Behavioral: Negative for depression. The patient has insomnia. The patient is not nervous/anxious.        Objective   /80 (BP Location: Left arm, Patient Position: Sitting, Cuff Size: Adult)   Pulse 50   Ht 182.9 cm (72\")   Wt 101 kg (223 lb)   SpO2 (!) 89% Comment: has oxygen at home  BMI 30.24 kg/m²   Physical Exam   Constitutional: He is oriented to person, place, and time. He appears well-developed and well-nourished.   HENT:   Head: Normocephalic and atraumatic.   Cardiovascular: Normal rate and normal heart sounds.  An irregularly irregular rhythm present.   No murmur heard.  Pulmonary/Chest: Effort normal and breath sounds normal.   Abdominal: He exhibits distension (Obese).   Musculoskeletal: He exhibits edema (Mild to moderate).   Neurological: He is alert and oriented to person, place, and time.   Skin: Skin is warm and dry.   Psychiatric: He has a normal mood and affect.       ECG 12 Lead  Date/Time: 5/16/2018 3:34 PM  Performed by: BRADLY DOTY  Authorized by: " BRADLY DOTY   Comparison: compared with previous ECG from 8/21/2017  Comparison to previous ECG: PVCs are no longer present  Rhythm: atrial fibrillation  Rate: normal  Conduction: left bundle branch block  QRS axis: right                ICD-10-CM ICD-9-CM   1. Chronic systolic congestive heart failure I50.22 428.22     428.0   2. Persistent atrial fibrillation I48.1 427.31   3. Essential hypertension I10 401.9   4. Tobacco abuse Z72.0 305.1   5. Class 1 obesity due to excess calories with serious comorbidity and body mass index (BMI) of 30.0 to 30.9 in adult E66.09 278.00    Z68.30 V85.30         Assessment/Plan:  1. Chronic systolic congestive heart failure: Recent inpatient admission for acute decompensationNovember 2017.  EF 20-25%.  Refuses any form of ischemic testing or invasive treatments and acknowledges understanding of risks and benefits. Titrate up lisinopril to 5 mg.  Continue metoprolol succinate, furosemide, and spironolactone.  BMP followed regularly by PCP.        2.  Atrial fibrillation: Continue rate control with metoprolol and anticoagulation with Eliquis.     3.  Essential hypertension: Blood pressure is well-controlled.     4.  Tobacco abuse: Continues to smoke approximately one half pack per day and not willing to consider.   on the dangers of tobacco abuse for approximately 4 minutes.     5.   Obesity: BMI Patient's Body mass index is 30.24 kg/m². BMI is above normal parameters. Recommendations include: exercise counseling and nutrition counseling.

## 2018-08-22 ENCOUNTER — OFFICE VISIT (OUTPATIENT)
Dept: CARDIOLOGY | Facility: CLINIC | Age: 68
End: 2018-08-22

## 2018-08-22 VITALS
OXYGEN SATURATION: 95 % | WEIGHT: 204 LBS | DIASTOLIC BLOOD PRESSURE: 84 MMHG | HEIGHT: 72 IN | HEART RATE: 74 BPM | BODY MASS INDEX: 27.63 KG/M2 | SYSTOLIC BLOOD PRESSURE: 118 MMHG

## 2018-08-22 DIAGNOSIS — I50.22 CHRONIC SYSTOLIC CONGESTIVE HEART FAILURE (HCC): Primary | ICD-10-CM

## 2018-08-22 DIAGNOSIS — Z72.0 TOBACCO ABUSE: ICD-10-CM

## 2018-08-22 DIAGNOSIS — I48.19 PERSISTENT ATRIAL FIBRILLATION (HCC): ICD-10-CM

## 2018-08-22 DIAGNOSIS — I10 ESSENTIAL HYPERTENSION: ICD-10-CM

## 2018-08-22 DIAGNOSIS — I50.22 CHRONIC SYSTOLIC CONGESTIVE HEART FAILURE (HCC): Chronic | ICD-10-CM

## 2018-08-22 PROCEDURE — 99406 BEHAV CHNG SMOKING 3-10 MIN: CPT | Performed by: INTERNAL MEDICINE

## 2018-08-22 PROCEDURE — 99214 OFFICE O/P EST MOD 30 MIN: CPT | Performed by: INTERNAL MEDICINE

## 2018-08-22 RX ORDER — LISINOPRIL 10 MG/1
10 TABLET ORAL DAILY
Qty: 30 TABLET | Refills: 11 | Status: SHIPPED | OUTPATIENT
Start: 2018-08-22 | End: 2018-11-14 | Stop reason: SDUPTHER

## 2018-08-22 NOTE — PROGRESS NOTES
Reason for Visit: cardiovascular follow up.    HPI:  Derek Sanchez is a 67 y.o. male is here today for follow-up.  He has been doing well and not having any issues or complaints.  His breathing is at baseline and he denies any chest pain, palpitations, dizziness, syncope, PND, or orthopnea.  He notes his weight has been down and feels that this is a healthier weight.  He is frustrated by the cost of Eliquis.      Previous Cardiac Testing and Procedures:  - Echo (08/22/2017) EF 25-30%, mild MR, mild RV dilation, mild RA dilation, moderate TR, mild PI, RVSP 50-55 mmHg  - EKG (08/21/2017) atrial fibrillation with heart rate 100 bpm, left axis deviation, left bundle branch block  - Echo (11/09/2017) EF 20-25%, moderate RV dilatation, moderate reduced RV function, mild AI and MR, moderate TR, mild to moderate PI, RVSP 40-45 mmHg    Patient Active Problem List   Diagnosis   • Atrial fibrillation (CMS/[facility])   • Hypertension   • COPD (chronic obstructive pulmonary disease) (CMS/[facility])   • SOB (shortness of breath)   • CHF (congestive heart failure) (CMS/[facility])   • Hypomagnesemia   • Tobacco abuse       Social History   Substance Use Topics   • Smoking status: Current Every Day Smoker     Packs/day: 1.00     Types: Cigarettes   • Smokeless tobacco: Never Used   • Alcohol use Yes      Comment: occ       Family History   Problem Relation Age of Onset   • Hypertension Mother    • Irregular heart beat Mother    • Dementia Mother    • Alcohol abuse Father    • Diabetes Father        The following portions of the patient's history were reviewed and updated as appropriate: allergies, current medications, past family history, past medical history, past social history, past surgical history and problem list.      Current Outpatient Prescriptions:   •  acetaminophen (TYLENOL) 500 MG tablet, Take 500 mg by mouth Every 6 (Six) Hours As Needed for Mild Pain ., Disp: , Rfl:   •  apixaban (ELIQUIS) 5 MG tablet tablet, Take 1 tablet by mouth  2 (Two) Times a Day., Disp: 60 tablet, Rfl: 11  •  aspirin 81 MG tablet, Take 81 mg by mouth Every Night., Disp: , Rfl:   •  BREO ELLIPTA 100-25 MCG/INH aerosol powder , Inhale 100 mcg Daily., Disp: , Rfl: 1  •  citalopram (CeleXA) 10 MG tablet, Take 10 mg by mouth Daily., Disp: , Rfl: 2  •  furosemide (LASIX) 40 MG tablet, Take 1 tablet by mouth Daily. With an extra tab as needed (Patient taking differently: Take 40 mg by mouth 2 (Two) Times a Day. With an extra tab as needed), Disp: 60 tablet, Rfl: 11  •  gabapentin (NEURONTIN) 300 MG capsule, Take 300 mg by mouth 3 (Three) Times a Day., Disp: , Rfl:   •  HYDROcodone-acetaminophen (NORCO) 7.5-325 MG per tablet, Take 7.5-325 tablets by mouth Every 6 (Six) Hours As Needed., Disp: , Rfl: 0  •  KLOR-CON 10 MEQ CR tablet, Take 10 mEq by mouth 2 (Two) Times a Day., Disp: , Rfl: 0  •  lisinopril (PRINIVIL,ZESTRIL) 10 MG tablet, Take 1 tablet by mouth Daily., Disp: 30 tablet, Rfl: 11  •  magnesium oxide (MAGOX 400) 400 (241.3 Mg) MG tablet tablet, Take 1 tablet by mouth 2 (Two) Times a Day., Disp: 60 tablet, Rfl: 11  •  metFORMIN (GLUCOPHAGE) 500 MG tablet, Take 250 mg by mouth 2 (Two) Times a Day With Meals., Disp: , Rfl:   •  metoprolol succinate XL (TOPROL-XL) 50 MG 24 hr tablet, Take 1 tablet by mouth Daily., Disp: 30 tablet, Rfl: 11  •  silver sulfadiazine (SILVADENE, SSD) 1 % cream, Apply 1 g topically Daily., Disp: , Rfl: 3  •  VENTOLIN  (90 Base) MCG/ACT inhaler, Inhale 2 puffs Take As Directed., Disp: , Rfl: 1  •  spironolactone (ALDACTONE) 25 MG tablet, Take 25 mg by mouth Daily., Disp: , Rfl: 3    Review of Systems   Constitution: Negative for chills and fever.   Cardiovascular: Positive for dyspnea on exertion. Negative for chest pain and paroxysmal nocturnal dyspnea.   Respiratory: Positive for shortness of breath. Negative for cough.    Skin: Negative for rash.   Gastrointestinal: Negative for abdominal pain and heartburn.   Neurological: Negative for  "dizziness and numbness.       Objective   /84 (BP Location: Left arm, Patient Position: Sitting, Cuff Size: Adult)   Pulse 74   Ht 182.9 cm (72.01\")   Wt 92.5 kg (204 lb)   SpO2 95%   BMI 27.66 kg/m²   Physical Exam   Constitutional: He is oriented to person, place, and time. He appears well-developed and well-nourished.   HENT:   Head: Normocephalic and atraumatic.   Cardiovascular: Normal rate and normal heart sounds.  An irregular rhythm present.   No murmur heard.  Pulmonary/Chest: Effort normal. He has wheezes.   Abdominal: He exhibits no distension.   Musculoskeletal: He exhibits no edema.   Neurological: He is alert and oriented to person, place, and time.   Skin: Skin is warm and dry.   Psychiatric: He has a normal mood and affect.     Procedures      ICD-10-CM ICD-9-CM   1. Chronic systolic congestive heart failure (CMS/[facility]) I50.22 428.22     428.0   2. Persistent atrial fibrillation (CMS/[facility]) I48.1 427.31   3. Essential hypertension I10 401.9   4. Tobacco abuse Z72.0 305.1   5. Chronic systolic congestive heart failure (CMS/[facility]) I50.22 428.22     428.0         Assessment/Plan:  1. Chronic systolic congestive heart failure: EF 20-25%.  Refuses any form of ischemic testing or invasive treatments and acknowledges understanding of risks and benefits. Titrate up lisinopril again to 10 mg.  Continue metoprolol succinate, furosemide, and spironolactone.       2.  Atrial fibrillation: Rate controlled with metoprolol and anticoagulation with Eliquis.     3.  Essential hypertension: Blood pressure is acceptable today.     4.  Tobacco abuse: I advised Derek of the risks of continuing to use tobacco, and I provided him with tobacco cessation educational materials in the After Visit Summary.  During this visit, I spent 3 minutes counseling the patient regarding tobacco cessation.        "

## 2018-09-27 ENCOUNTER — TELEPHONE (OUTPATIENT)
Dept: CARDIOLOGY | Facility: CLINIC | Age: 68
End: 2018-09-27

## 2018-09-28 ENCOUNTER — TELEPHONE (OUTPATIENT)
Dept: CARDIOLOGY | Facility: CLINIC | Age: 68
End: 2018-09-28

## 2018-09-28 NOTE — TELEPHONE ENCOUNTER
----- Message from LIO Benitez sent at 9/28/2018  4:00 PM CDT -----  Please let patient know lab is stable. a1c is 5.5      CALLED PT INFORMED HIM OF LAB RESULTS PT VOICED UNDERSTANDING

## 2018-11-14 ENCOUNTER — OFFICE VISIT (OUTPATIENT)
Dept: CARDIOLOGY | Facility: CLINIC | Age: 68
End: 2018-11-14

## 2018-11-14 VITALS
HEART RATE: 63 BPM | RESPIRATION RATE: 20 BRPM | DIASTOLIC BLOOD PRESSURE: 80 MMHG | SYSTOLIC BLOOD PRESSURE: 120 MMHG | WEIGHT: 225.4 LBS | OXYGEN SATURATION: 94 % | HEIGHT: 72 IN | BODY MASS INDEX: 30.53 KG/M2

## 2018-11-14 DIAGNOSIS — I10 ESSENTIAL HYPERTENSION: ICD-10-CM

## 2018-11-14 DIAGNOSIS — Z72.0 TOBACCO ABUSE: ICD-10-CM

## 2018-11-14 DIAGNOSIS — I48.19 PERSISTENT ATRIAL FIBRILLATION (HCC): ICD-10-CM

## 2018-11-14 DIAGNOSIS — I50.22 CHRONIC SYSTOLIC CONGESTIVE HEART FAILURE (HCC): Primary | ICD-10-CM

## 2018-11-14 PROCEDURE — 99406 BEHAV CHNG SMOKING 3-10 MIN: CPT | Performed by: INTERNAL MEDICINE

## 2018-11-14 PROCEDURE — 99214 OFFICE O/P EST MOD 30 MIN: CPT | Performed by: INTERNAL MEDICINE

## 2018-11-14 RX ORDER — FUROSEMIDE 40 MG/1
40 TABLET ORAL DAILY
Refills: 2 | COMMUNITY
Start: 2018-10-16

## 2018-11-14 RX ORDER — LISINOPRIL 20 MG/1
20 TABLET ORAL DAILY
Qty: 30 TABLET | Refills: 11 | Status: SHIPPED | OUTPATIENT
Start: 2018-11-14 | End: 2019-10-03 | Stop reason: SDUPTHER

## 2018-11-14 NOTE — PROGRESS NOTES
Reason for Visit: cardiovascular follow up.    HPI:  Derek Sanchez is a 68 y.o. male is here today for 3 month follow-up.  He stopped taking the Eliquis due to the doughnut hole with Medicare.  He plans to resume this when possible.  He understands the risks.  He continues to smoke.  His blood pressure has been controlled.  His breathing has been at baseline.  He denies any chest pain, palpitations, dizziness, syncope, PND, or orthopnea.  The sores on his arms and bruising has improved since stopping the Eliquis.  He feels as good as he has in some time.      Previous Cardiac Testing and Procedures:  - Echo (08/22/2017) EF 25-30%, mild MR, mild RV dilation, mild RA dilation, moderate TR, mild PI, RVSP 50-55 mmHg  - EKG (08/21/2017) atrial fibrillation with heart rate 100 bpm, left axis deviation, left bundle branch block  - Echo (11/09/2017) EF 20-25%, moderate RV dilatation, moderate reduced RV function, mild AI and MR, moderate TR, mild to moderate PI, RVSP 40-45 mmHg    Patient Active Problem List   Diagnosis   • Atrial fibrillation (CMS/[facility])   • Hypertension   • COPD (chronic obstructive pulmonary disease) (CMS/[facility])   • SOB (shortness of breath)   • CHF (congestive heart failure) (CMS/[facility])   • Hypomagnesemia   • Tobacco abuse       Social History     Tobacco Use   • Smoking status: Current Every Day Smoker     Packs/day: 1.00     Types: Cigarettes   • Smokeless tobacco: Never Used   Substance Use Topics   • Alcohol use: Yes     Comment: occ   • Drug use: No       Family History   Problem Relation Age of Onset   • Hypertension Mother    • Irregular heart beat Mother    • Dementia Mother    • Alcohol abuse Father    • Diabetes Father        The following portions of the patient's history were reviewed and updated as appropriate: allergies, current medications, past family history, past medical history, past social history, past surgical history and problem list.      Current Outpatient Medications:   •   "acetaminophen (TYLENOL) 500 MG tablet, Take 500 mg by mouth Every 6 (Six) Hours As Needed for Mild Pain ., Disp: , Rfl:   •  aspirin 81 MG tablet, Take 81 mg by mouth Every Night., Disp: , Rfl:   •  citalopram (CeleXA) 10 MG tablet, Take 10 mg by mouth Daily., Disp: , Rfl: 2  •  gabapentin (NEURONTIN) 300 MG capsule, Take 300 mg by mouth 3 (Three) Times a Day., Disp: , Rfl:   •  HYDROcodone-acetaminophen (NORCO) 7.5-325 MG per tablet, Take 7.5-325 tablets by mouth Every 6 (Six) Hours As Needed., Disp: , Rfl: 0  •  KLOR-CON 10 MEQ CR tablet, Take 10 mEq by mouth 2 (Two) Times a Day., Disp: , Rfl: 0  •  lisinopril (PRINIVIL,ZESTRIL) 10 MG tablet, Take 1 tablet by mouth Daily., Disp: 30 tablet, Rfl: 11  •  metFORMIN (GLUCOPHAGE) 500 MG tablet, Take 250 mg by mouth 2 (Two) Times a Day With Meals., Disp: , Rfl:   •  metoprolol succinate XL (TOPROL-XL) 50 MG 24 hr tablet, Take 1 tablet by mouth Daily., Disp: 30 tablet, Rfl: 11  •  spironolactone (ALDACTONE) 25 MG tablet, Take 25 mg by mouth Daily., Disp: , Rfl: 3  •  VENTOLIN  (90 Base) MCG/ACT inhaler, Inhale 2 puffs Take As Directed., Disp: , Rfl: 1  •  furosemide (LASIX) 40 MG tablet, Take 40 mg by mouth Daily., Disp: , Rfl: 2    Review of Systems   Constitution: Negative for chills and fever.   Cardiovascular: Negative for chest pain and paroxysmal nocturnal dyspnea.   Respiratory: Negative for cough and shortness of breath.    Skin: Negative for rash.   Gastrointestinal: Negative for abdominal pain and heartburn.   Neurological: Negative for dizziness and numbness.   Psychiatric/Behavioral: The patient is nervous/anxious.        Objective   /80 (BP Location: Right arm, Patient Position: Sitting, Cuff Size: Adult)   Pulse 63   Resp 20   Ht 182.9 cm (72\")   Wt 102 kg (225 lb 6.4 oz)   SpO2 94%   BMI 30.57 kg/m²   Physical Exam   Constitutional: He is oriented to person, place, and time. He appears well-developed and well-nourished.   HENT:   Head: " Normocephalic and atraumatic.   Cardiovascular: Normal rate and normal heart sounds. An irregular rhythm present.   No murmur heard.  Pulmonary/Chest: Effort normal and breath sounds normal.   Abdominal: He exhibits no distension.   Musculoskeletal: He exhibits no edema.   Neurological: He is alert and oriented to person, place, and time.   Skin: Skin is warm and dry.   Psychiatric: He has a normal mood and affect.       ECG 12 Lead  Date/Time: 11/15/2018 11:32 AM  Performed by: Yemi Menjivar MD  Authorized by: Yemi Menjivar MD   Comparison: compared with previous ECG from 5/16/2018  Comparison to previous ECG: Left anterior fascicular block has replaced left bundle branch block  Rhythm: sinus rhythm  Rate: normal  Conduction: LAFB  Other findings comments: Nonspecific ST changes                ICD-10-CM ICD-9-CM   1. Chronic systolic congestive heart failure (CMS/[facility]) I50.22 428.22     428.0   2. Persistent atrial fibrillation (CMS/[facility]) I48.1 427.31   3. Essential hypertension I10 401.9   4. Tobacco abuse Z72.0 305.1         Assessment/Plan:  1. Chronic systolic congestive heart failure: EF 20-25%.  Refuses any form of ischemic testing or invasive treatments and acknowledges understanding of risks and benefits. Titrate up lisinopril to 20 mg.  Continue metoprolol succinate, furosemide, and spironolactone.       2.  Atrial fibrillation: Good ate controlled with metoprolol.  Stopped Eliquis due to cost.  Wants to try an alternative due to bruising and wounds.  Will change to Xarelto.       3.  Essential hypertension: Blood pressure is acceptable today.     4.  Tobacco abuse: Continues to smoke 1 PPD.  I advised Derek of the risks of continuing to use tobacco, and I provided him with tobacco cessation educational materials in the After Visit Summary.  During this visit, I spent 5 minutes counseling the patient regarding tobacco cessation.

## 2018-11-15 PROCEDURE — 93000 ELECTROCARDIOGRAM COMPLETE: CPT | Performed by: INTERNAL MEDICINE

## 2019-03-14 DIAGNOSIS — I48.19 PERSISTENT ATRIAL FIBRILLATION (HCC): ICD-10-CM

## 2019-03-14 DIAGNOSIS — I10 ESSENTIAL HYPERTENSION: ICD-10-CM

## 2019-03-14 DIAGNOSIS — I50.22 CHRONIC SYSTOLIC CONGESTIVE HEART FAILURE (HCC): ICD-10-CM

## 2019-03-14 RX ORDER — METOPROLOL SUCCINATE 50 MG/1
TABLET, EXTENDED RELEASE ORAL
Qty: 30 TABLET | Refills: 8 | Status: SHIPPED | OUTPATIENT
Start: 2019-03-14

## 2019-03-28 ENCOUNTER — OFFICE VISIT (OUTPATIENT)
Dept: CARDIOLOGY | Facility: CLINIC | Age: 69
End: 2019-03-28

## 2019-03-28 VITALS
WEIGHT: 240 LBS | BODY MASS INDEX: 32.51 KG/M2 | OXYGEN SATURATION: 92 % | DIASTOLIC BLOOD PRESSURE: 62 MMHG | SYSTOLIC BLOOD PRESSURE: 110 MMHG | HEART RATE: 80 BPM | HEIGHT: 72 IN

## 2019-03-28 DIAGNOSIS — I10 ESSENTIAL HYPERTENSION: ICD-10-CM

## 2019-03-28 DIAGNOSIS — I48.19 PERSISTENT ATRIAL FIBRILLATION (HCC): ICD-10-CM

## 2019-03-28 DIAGNOSIS — E66.09 CLASS 1 OBESITY DUE TO EXCESS CALORIES WITH SERIOUS COMORBIDITY AND BODY MASS INDEX (BMI) OF 32.0 TO 32.9 IN ADULT: ICD-10-CM

## 2019-03-28 DIAGNOSIS — Z72.0 TOBACCO ABUSE: ICD-10-CM

## 2019-03-28 DIAGNOSIS — I50.22 CHRONIC SYSTOLIC CONGESTIVE HEART FAILURE (HCC): Primary | ICD-10-CM

## 2019-03-28 DIAGNOSIS — I50.22 CHRONIC SYSTOLIC CONGESTIVE HEART FAILURE (HCC): ICD-10-CM

## 2019-03-28 PROBLEM — E66.9 OBESITY: Status: ACTIVE | Noted: 2019-03-28

## 2019-03-28 PROCEDURE — 99406 BEHAV CHNG SMOKING 3-10 MIN: CPT | Performed by: INTERNAL MEDICINE

## 2019-03-28 PROCEDURE — 99214 OFFICE O/P EST MOD 30 MIN: CPT | Performed by: INTERNAL MEDICINE

## 2019-03-28 NOTE — PROGRESS NOTES
Reason for Visit: cardiovascular follow up.    HPI:  Derek Sanchez is a 68 y.o. male is here today for follow-up.  He has been doing relatively.  He has not had any recent issues or complaints.  He denies any chest pain, palpitations, dizziness, syncope, PND, or orthopnea.  He feels his bruising has improved with the change to Xarelto.  His blood pressure has been as good as it has ever been.  Has had some stress related to his mother.  He continues to smoke 1 PPD.  He is not willing to quit smoking.      Previous Cardiac Testing and Procedures:  - Echo (08/22/2017) EF 25-30%, mild MR, mild RV dilation, mild RA dilation, moderate TR, mild PI, RVSP 50-55 mmHg  - EKG (08/21/2017) atrial fibrillation with heart rate 100 bpm, left axis deviation, left bundle branch block  - Echo (11/09/2017) EF 20-25%, moderate RV dilatation, moderate reduced RV function, mild AI and MR, moderate TR, mild to moderate PI, RVSP 40-45 mmHg      Patient Active Problem List   Diagnosis   • Atrial fibrillation (CMS/[facility])   • Hypertension   • COPD (chronic obstructive pulmonary disease) (CMS/[facility])   • CHF (congestive heart failure) (CMS/[facility])   • Hypomagnesemia   • Tobacco abuse   • Obesity       Social History     Tobacco Use   • Smoking status: Current Every Day Smoker     Packs/day: 1.00     Types: Cigarettes   • Smokeless tobacco: Never Used   Substance Use Topics   • Alcohol use: Yes     Comment: occ   • Drug use: No       Family History   Problem Relation Age of Onset   • Hypertension Mother    • Irregular heart beat Mother    • Dementia Mother    • Alcohol abuse Father    • Diabetes Father        The following portions of the patient's history were reviewed and updated as appropriate: allergies, current medications, past family history, past medical history, past social history, past surgical history and problem list.      Current Outpatient Medications:   •  acetaminophen (TYLENOL) 500 MG tablet, Take 500 mg by mouth Every 6 (Six)  "Hours As Needed for Mild Pain ., Disp: , Rfl:   •  aspirin 81 MG tablet, Take 81 mg by mouth Every Night., Disp: , Rfl:   •  citalopram (CeleXA) 10 MG tablet, Take 10 mg by mouth Daily., Disp: , Rfl: 2  •  furosemide (LASIX) 40 MG tablet, Take 40 mg by mouth Daily., Disp: , Rfl: 2  •  gabapentin (NEURONTIN) 300 MG capsule, Take 300 mg by mouth 3 (Three) Times a Day., Disp: , Rfl:   •  HYDROcodone-acetaminophen (NORCO) 7.5-325 MG per tablet, Take 7.5-325 tablets by mouth Every 6 (Six) Hours As Needed., Disp: , Rfl: 0  •  KLOR-CON 10 MEQ CR tablet, Take 10 mEq by mouth 2 (Two) Times a Day., Disp: , Rfl: 0  •  lisinopril (PRINIVIL,ZESTRIL) 20 MG tablet, Take 1 tablet by mouth Daily., Disp: 30 tablet, Rfl: 11  •  metFORMIN (GLUCOPHAGE) 500 MG tablet, Take 250 mg by mouth 2 (Two) Times a Day With Meals., Disp: , Rfl:   •  metoprolol succinate XL (TOPROL-XL) 50 MG 24 hr tablet, TAKE 1 TABLET BY MOUTH EVERY DAY, Disp: 30 tablet, Rfl: 8  •  rivaroxaban (XARELTO) 20 MG tablet, Take 1 tablet by mouth Daily., Disp: 30 tablet, Rfl: 11  •  spironolactone (ALDACTONE) 25 MG tablet, Take 25 mg by mouth Daily., Disp: , Rfl: 3  •  VENTOLIN  (90 Base) MCG/ACT inhaler, Inhale 2 puffs Take As Directed., Disp: , Rfl: 1    Review of Systems   Constitution: Negative for chills and fever.   Cardiovascular: Negative for chest pain and paroxysmal nocturnal dyspnea.   Respiratory: Positive for cough. Negative for shortness of breath.    Skin: Negative for rash.   Gastrointestinal: Negative for abdominal pain and heartburn.   Neurological: Negative for dizziness and numbness.       Objective   /62 (BP Location: Left arm, Patient Position: Sitting, Cuff Size: Adult)   Pulse 80   Ht 182.9 cm (72.01\")   Wt 109 kg (240 lb)   SpO2 92%   BMI 32.54 kg/m²   Physical Exam   Constitutional: He is oriented to person, place, and time. He appears well-developed and well-nourished.   HENT:   Head: Normocephalic and atraumatic. "   Cardiovascular: Normal rate, regular rhythm and normal heart sounds.   No murmur heard.  Pulmonary/Chest: Effort normal and breath sounds normal.   Abdominal: He exhibits distension (obese).   Musculoskeletal: He exhibits no edema.   Neurological: He is alert and oriented to person, place, and time.   Skin: Skin is warm and dry.   Psychiatric: He has a normal mood and affect.     Procedures      ICD-10-CM ICD-9-CM   1. Chronic systolic congestive heart failure (CMS/HCC) I50.22 428.22     428.0   2. Persistent atrial fibrillation (CMS/HCC) I48.1 427.31   3. Essential hypertension I10 401.9   4. Tobacco abuse Z72.0 305.1   5. Class 1 obesity due to excess calories with serious comorbidity and body mass index (BMI) of 32.0 to 32.9 in adult E66.09 278.00    Z68.32 V85.32         Assessment/Plan:  1. Chronic systolic congestive heart failure: EF 20-25%.  Continues to refuse any form of ischemic testing or invasive treatments and understands the risks. Continue metoprolol succinate, lisinopril, furosemide, and spironolactone.       2.  Atrial fibrillation: Continue rate control with metoprolol.  On anticoagulation with Xarelto.     3.  Essential hypertension: Blood pressure is well controlled today on current therapy     4.  Tobacco abuse: Continues to smoke 1 PPD.  Not willing to consider stopping.  I advised Derek of the risks of continuing to use tobacco, and I provided him with tobacco cessation educational materials in the After Visit Summary.  During this visit, I spent 3 minutes counseling the patient regarding tobacco cessation.     5.  Obesity: Patient's Body mass index is 32.54 kg/m². BMI is above normal parameters. Recommendations include: exercise counseling.

## 2019-03-29 RX ORDER — METOPROLOL SUCCINATE 50 MG/1
TABLET, EXTENDED RELEASE ORAL
Qty: 30 TABLET | Refills: 8 | OUTPATIENT
Start: 2019-03-29

## 2019-10-03 ENCOUNTER — OFFICE VISIT (OUTPATIENT)
Dept: CARDIOLOGY | Facility: CLINIC | Age: 69
End: 2019-10-03

## 2019-10-03 VITALS
HEART RATE: 84 BPM | HEIGHT: 72 IN | BODY MASS INDEX: 34.05 KG/M2 | OXYGEN SATURATION: 94 % | DIASTOLIC BLOOD PRESSURE: 76 MMHG | WEIGHT: 251.4 LBS | SYSTOLIC BLOOD PRESSURE: 130 MMHG

## 2019-10-03 DIAGNOSIS — Z72.0 TOBACCO ABUSE: Primary | ICD-10-CM

## 2019-10-03 DIAGNOSIS — I50.22 CHRONIC SYSTOLIC CONGESTIVE HEART FAILURE (HCC): ICD-10-CM

## 2019-10-03 DIAGNOSIS — E66.09 CLASS 1 OBESITY DUE TO EXCESS CALORIES WITH SERIOUS COMORBIDITY AND BODY MASS INDEX (BMI) OF 34.0 TO 34.9 IN ADULT: ICD-10-CM

## 2019-10-03 DIAGNOSIS — I10 ESSENTIAL HYPERTENSION: ICD-10-CM

## 2019-10-03 DIAGNOSIS — I48.19 PERSISTENT ATRIAL FIBRILLATION (HCC): ICD-10-CM

## 2019-10-03 PROBLEM — E83.42 HYPOMAGNESEMIA: Status: RESOLVED | Noted: 2017-09-11 | Resolved: 2019-10-03

## 2019-10-03 PROCEDURE — 99214 OFFICE O/P EST MOD 30 MIN: CPT | Performed by: INTERNAL MEDICINE

## 2019-10-03 PROCEDURE — 99406 BEHAV CHNG SMOKING 3-10 MIN: CPT | Performed by: INTERNAL MEDICINE

## 2019-10-03 RX ORDER — LISINOPRIL 40 MG/1
40 TABLET ORAL DAILY
Qty: 30 TABLET | Refills: 11 | Status: SHIPPED | OUTPATIENT
Start: 2019-10-03 | End: 2020-09-21

## 2019-10-03 RX ORDER — LANOLIN ALCOHOL/MO/W.PET/CERES
1 CREAM (GRAM) TOPICAL 2 TIMES DAILY
Refills: 1 | COMMUNITY
Start: 2019-09-19

## 2019-10-03 NOTE — PROGRESS NOTES
Reason for Visit: cardiovascular follow up.    HPI:  Derek Sanchez is a 68 y.o. male is here today for 6 month follow-up.  He has been doing well and not having any issues or complaints.  He denies any chest pain, palpitations, dizziness, syncope, PND, or orthopnea.  He had easy bruising and bleeding on Xarelto and he stopped taking it.  His weight and breathing have been at baseline.  He remains active.  He has a lot of stress related to taking care of his mother who has dementia.  He is not sure if he is going to be able to continue with his current arrangement.  He continues to deny wanting a heart catheterization and understands the risk.    Previous Cardiac Testing and Procedures:  - Echo (08/22/2017) EF 25-30%, mild MR, mild RV dilation, mild RA dilation, moderate TR, mild PI, RVSP 50-55 mmHg  - EKG (08/21/2017) atrial fibrillation with heart rate 100 bpm, left axis deviation, left bundle branch block  - Echo (11/09/2017) EF 20-25%, moderate RV dilatation, moderate reduced RV function, mild AI and MR, moderate TR, mild to moderate PI, RVSP 40-45 mmHg    Patient Active Problem List   Diagnosis   • Atrial fibrillation (CMS/[facility])   • Hypertension   • COPD (chronic obstructive pulmonary disease) (CMS/[facility])   • CHF (congestive heart failure) (CMS/[facility])   • Tobacco abuse   • Obesity       Social History     Tobacco Use   • Smoking status: Current Every Day Smoker     Packs/day: 1.00     Types: Cigarettes   • Smokeless tobacco: Never Used   Substance Use Topics   • Alcohol use: Yes     Comment: occ   • Drug use: No       Family History   Problem Relation Age of Onset   • Hypertension Mother    • Irregular heart beat Mother    • Dementia Mother    • Alcohol abuse Father    • Diabetes Father        The following portions of the patient's history were reviewed and updated as appropriate: allergies, current medications, past family history, past medical history, past social history, past surgical history and problem  "list.      Current Outpatient Medications:   •  acetaminophen (TYLENOL) 500 MG tablet, Take 500 mg by mouth Every 6 (Six) Hours As Needed for Mild Pain ., Disp: , Rfl:   •  citalopram (CeleXA) 10 MG tablet, Take 10 mg by mouth Daily., Disp: , Rfl: 2  •  furosemide (LASIX) 40 MG tablet, Take 40 mg by mouth Daily., Disp: , Rfl: 2  •  gabapentin (NEURONTIN) 300 MG capsule, Take 300 mg by mouth 3 (Three) Times a Day., Disp: , Rfl:   •  HYDROcodone-acetaminophen (NORCO) 7.5-325 MG per tablet, Take 7.5-325 tablets by mouth Every 6 (Six) Hours As Needed., Disp: , Rfl: 0  •  KLOR-CON 10 MEQ CR tablet, Take 10 mEq by mouth 2 (Two) Times a Day., Disp: , Rfl: 0  •  lisinopril (PRINIVIL,ZESTRIL) 40 MG tablet, Take 1 tablet by mouth Daily., Disp: 30 tablet, Rfl: 11  •  metFORMIN (GLUCOPHAGE) 500 MG tablet, Take 250 mg by mouth 2 (Two) Times a Day With Meals., Disp: , Rfl:   •  metoprolol succinate XL (TOPROL-XL) 50 MG 24 hr tablet, TAKE 1 TABLET BY MOUTH EVERY DAY, Disp: 30 tablet, Rfl: 8  •  spironolactone (ALDACTONE) 25 MG tablet, Take 25 mg by mouth Daily., Disp: , Rfl: 3  •  VENTOLIN  (90 Base) MCG/ACT inhaler, Inhale 2 puffs Take As Directed., Disp: , Rfl: 1  •  Magnesium Oxide 400 (240 Mg) MG tablet, Take 1 tablet by mouth 2 (Two) Times a Day., Disp: , Rfl: 1  •  rivaroxaban (XARELTO) 20 MG tablet, Take 1 tablet by mouth Daily., Disp: 30 tablet, Rfl: 11    Review of Systems   Constitution: Negative for chills and fever.   Cardiovascular: Negative for chest pain and paroxysmal nocturnal dyspnea.   Respiratory: Negative for cough and shortness of breath.    Skin: Negative for rash.   Gastrointestinal: Negative for abdominal pain and heartburn.   Neurological: Negative for dizziness and numbness.       Objective   /76 (BP Location: Left arm, Patient Position: Sitting, Cuff Size: Adult)   Pulse 84   Ht 182.9 cm (72.01\")   Wt 114 kg (251 lb 6.4 oz)   SpO2 94%   BMI 34.09 kg/m²   Physical Exam   Constitutional: " He is oriented to person, place, and time. He appears well-developed and well-nourished.   HENT:   Head: Normocephalic and atraumatic.   Cardiovascular: Normal rate and normal heart sounds. An irregular rhythm present.   No murmur heard.  Pulmonary/Chest: Effort normal and breath sounds normal.   Abdominal: He exhibits distension (obese).   Musculoskeletal: He exhibits no edema.   Neurological: He is alert and oriented to person, place, and time.   Skin: Skin is warm and dry.   Psychiatric: He has a normal mood and affect.     Procedures      ICD-10-CM ICD-9-CM   1. Tobacco abuse Z72.0 305.1   2. Persistent atrial fibrillation I48.19 427.31   3. Chronic systolic congestive heart failure (CMS/HCC) I50.22 428.22     428.0   4. Essential hypertension I10 401.9   5. Class 1 obesity due to excess calories with serious comorbidity and body mass index (BMI) of 34.0 to 34.9 in adult E66.09 278.00    Z68.34 V85.34         Assessment/Plan:  1. Chronic systolic congestive heart failure: EF 20-25%.  Continues to refuse any form of ischemic testing.  Also unwilling to consider an AICD.  Continue medical therapy with metoprolol succinate, lisinopril, furosemide, and spironolactone.  Titrate up lisinopril to 40 mg.     2.  Paroxysmal atrial fibrillation: Irregular rhythm on exam today.  Continue metoprolol and encourage patient to restart Xarelto.  Will discontinue aspirin to help minimize easy bruising.     3.  Essential hypertension: Blood pressure is borderline today.  Will titrate up lisinopril to 40 mg.     4.  Tobacco abuse: Derek Sancehz is a current cigarettes user.  He currently smokes 1 pack of cigarettes per day for a duration of many years. I have educated him on the risk of diseases from using tobacco products such as cancer, COPD and heart diease.  I advised him to quit and he is not willing to quit.  I spent 4 minutes counseling the patient.    5.  Obesity: Patient's Body mass index is 34.09 kg/m². BMI is above  normal parameters. Recommendations include: exercise counseling and nutrition counseling.

## 2019-12-23 DIAGNOSIS — E87.5 HYPERKALEMIA: Primary | ICD-10-CM

## 2019-12-23 RX ORDER — SPIRONOLACTONE 25 MG/1
12.5 TABLET ORAL DAILY
Qty: 45 TABLET | Refills: 3 | Status: SHIPPED | OUTPATIENT
Start: 2019-12-23 | End: 2020-07-22 | Stop reason: SDUPTHER

## 2020-07-22 ENCOUNTER — OFFICE VISIT (OUTPATIENT)
Dept: CARDIOLOGY | Facility: CLINIC | Age: 70
End: 2020-07-22

## 2020-07-22 VITALS
HEIGHT: 72 IN | SYSTOLIC BLOOD PRESSURE: 130 MMHG | BODY MASS INDEX: 33.18 KG/M2 | DIASTOLIC BLOOD PRESSURE: 84 MMHG | OXYGEN SATURATION: 91 % | WEIGHT: 245 LBS | TEMPERATURE: 98.7 F | HEART RATE: 66 BPM

## 2020-07-22 DIAGNOSIS — E66.09 CLASS 1 OBESITY DUE TO EXCESS CALORIES WITH SERIOUS COMORBIDITY AND BODY MASS INDEX (BMI) OF 33.0 TO 33.9 IN ADULT: ICD-10-CM

## 2020-07-22 DIAGNOSIS — I48.0 PAROXYSMAL ATRIAL FIBRILLATION (HCC): ICD-10-CM

## 2020-07-22 DIAGNOSIS — Z72.0 TOBACCO ABUSE: ICD-10-CM

## 2020-07-22 DIAGNOSIS — I50.22 CHRONIC SYSTOLIC CONGESTIVE HEART FAILURE (HCC): Primary | ICD-10-CM

## 2020-07-22 DIAGNOSIS — I10 ESSENTIAL HYPERTENSION: ICD-10-CM

## 2020-07-22 PROCEDURE — 93000 ELECTROCARDIOGRAM COMPLETE: CPT | Performed by: INTERNAL MEDICINE

## 2020-07-22 PROCEDURE — 99214 OFFICE O/P EST MOD 30 MIN: CPT | Performed by: INTERNAL MEDICINE

## 2020-07-22 RX ORDER — SPIRONOLACTONE 25 MG/1
25 TABLET ORAL DAILY
Qty: 90 TABLET | Refills: 3 | Status: SHIPPED | OUTPATIENT
Start: 2020-07-22

## 2020-07-22 NOTE — PROGRESS NOTES
Reason for Visit: cardiovascular follow up.    HPI:  Derek Sanchez is a 69 y.o. male is here today for follow-up.  He has been having a lot of nerve pain and is trying over the counter supplements.  He stopped taking metformin and reports his blood sugar has been reasonably well controlled without it.  He has not had any recent cardiac issues.  He denies any chest pain, palpitations, dizziness, syncope, PND, or orthopnea.   He stopped Xarelto since it made him bruise easily.  He also felt he bled easily if he cut himself.      Previous Cardiac Testing and Procedures:  - Echo (08/22/2017) EF 25-30%, mild MR, mild RV dilation, mild RA dilation, moderate TR, mild PI, RVSP 50-55 mmHg  - EKG (08/21/2017) atrial fibrillation with heart rate 100 bpm, left axis deviation, left bundle branch block  - Echo (11/09/2017) EF 20-25%, moderate RV dilatation, moderate reduced RV function, mild AI and MR, moderate TR, mild to moderate PI, RVSP 40-45 mmHg    Patient Active Problem List   Diagnosis   • Paroxysmal atrial fibrillation (CMS/[facility])   • Essential hypertension   • COPD (chronic obstructive pulmonary disease) (CMS/[facility])   • Chronic systolic congestive heart failure (CMS/[facility])   • Tobacco abuse   • Class 1 obesity due to excess calories with serious comorbidity and body mass index (BMI) of 33.0 to 33.9 in adult       Social History     Tobacco Use   • Smoking status: Current Every Day Smoker     Packs/day: 1.00     Types: Cigarettes   • Smokeless tobacco: Never Used   Substance Use Topics   • Alcohol use: Yes     Comment: occ   • Drug use: No       Family History   Problem Relation Age of Onset   • Hypertension Mother    • Irregular heart beat Mother    • Dementia Mother    • Alcohol abuse Father    • Diabetes Father        The following portions of the patient's history were reviewed and updated as appropriate: allergies, current medications, past family history, past medical history, past social history, past surgical  "history and problem list.      Current Outpatient Medications:   •  acetaminophen (TYLENOL) 500 MG tablet, Take 500 mg by mouth Every 6 (Six) Hours As Needed for Mild Pain ., Disp: , Rfl:   •  citalopram (CeleXA) 10 MG tablet, Take 10 mg by mouth Daily., Disp: , Rfl: 2  •  furosemide (LASIX) 40 MG tablet, Take 40 mg by mouth Daily., Disp: , Rfl: 2  •  gabapentin (NEURONTIN) 300 MG capsule, Take 300 mg by mouth 3 (Three) Times a Day., Disp: , Rfl:   •  KLOR-CON 10 MEQ CR tablet, Take 10 mEq by mouth 2 (Two) Times a Day., Disp: , Rfl: 0  •  lisinopril (PRINIVIL,ZESTRIL) 40 MG tablet, Take 1 tablet by mouth Daily., Disp: 30 tablet, Rfl: 11  •  Magnesium Oxide 400 (240 Mg) MG tablet, Take 1 tablet by mouth 2 (Two) Times a Day., Disp: , Rfl: 1  •  metoprolol succinate XL (TOPROL-XL) 50 MG 24 hr tablet, TAKE 1 TABLET BY MOUTH EVERY DAY, Disp: 30 tablet, Rfl: 8  •  NON FORMULARY, NERVE CONTROL 911 HERBAL, Disp: , Rfl:   •  spironolactone (ALDACTONE) 25 MG tablet, Take 1 tablet by mouth Daily., Disp: 90 tablet, Rfl: 3  •  VENTOLIN  (90 Base) MCG/ACT inhaler, Inhale 2 puffs Take As Directed., Disp: , Rfl: 1    Review of Systems   Constitution: Negative for chills and fever.   Cardiovascular: Negative for chest pain and paroxysmal nocturnal dyspnea.   Respiratory: Negative for cough and shortness of breath.    Skin: Negative for rash.   Gastrointestinal: Negative for abdominal pain and heartburn.   Neurological: Positive for numbness and sensory change. Negative for dizziness.       Objective   /84 (BP Location: Left arm, Patient Position: Sitting, Cuff Size: Adult)   Pulse 66   Temp 98.7 °F (37.1 °C)   Ht 182.9 cm (72.01\")   Wt 111 kg (245 lb)   SpO2 91%   BMI 33.22 kg/m²   Physical Exam   Constitutional: He is oriented to person, place, and time. He appears well-developed and well-nourished.   HENT:   Head: Normocephalic and atraumatic.   Cardiovascular: Normal rate, regular rhythm and normal heart " sounds.   No murmur heard.  Pulmonary/Chest: Effort normal and breath sounds normal.   Abdominal: He exhibits distension (obese).   Musculoskeletal: He exhibits no edema.   Neurological: He is alert and oriented to person, place, and time.   Skin: Skin is warm and dry.   Psychiatric: He has a normal mood and affect.       ECG 12 Lead  Date/Time: 7/22/2020 3:47 PM  Performed by: Yemi Menjivar MD  Authorized by: Yemi Menjivar MD   Comparison: compared with previous ECG from 11/28/2018  Similar to previous ECG  Rhythm: sinus rhythm  Rate: normal  Conduction: left anterior fascicular block              ICD-10-CM ICD-9-CM   1. Chronic systolic congestive heart failure (CMS/HCC) I50.22 428.22     428.0   2. Paroxysmal atrial fibrillation (CMS/HCC) I48.0 427.31   3. Essential hypertension I10 401.9   4. Tobacco abuse Z72.0 305.1   5. Class 1 obesity due to excess calories with serious comorbidity and body mass index (BMI) of 33.0 to 33.9 in adult E66.09 278.00    Z68.33 V85.33         Assessment/Plan:  1. Chronic systolic congestive heart failure: EF 20-25%.  He refused any form of ischemic testing and is not willing to consider an AICD.  Continue metoprolol succinate, lisinopril, and furosemide.  Titrate up spironolactone to 25 mg.       2.  Paroxysmal atrial fibrillation: Normal sinus rhythm on EKG today. Continue metoprolol.  He stopped Xarelto and is not willing to take at this time.  Will continue to address going forward.       3.  Essential hypertension: Blood pressure remains borderline today.  Will titrate up spironolactone to 25 mg and check BMP in 2 weeks.     4.  Tobacco abuse: Derek Sanchez  reports that he has been smoking cigarettes. He has been smoking about 1.00 pack per day. He has never used smokeless tobacco.. I have educated him on the risk of diseases from using tobacco products such as cancer, COPD and heart diease.  I advised him to quit and he is not willing to quit.  I spent 4 minutes  counseling the patient.     5.  Obesity: Patient's Body mass index is 33.22 kg/m². BMI is above normal parameters. Recommendations include: exercise counseling and nutrition counseling.

## 2020-08-05 ENCOUNTER — RESULTS ENCOUNTER (OUTPATIENT)
Dept: CARDIOLOGY | Facility: CLINIC | Age: 70
End: 2020-08-05

## 2020-08-05 DIAGNOSIS — I50.22 CHRONIC SYSTOLIC CONGESTIVE HEART FAILURE (HCC): ICD-10-CM

## 2020-09-19 DIAGNOSIS — I50.22 CHRONIC SYSTOLIC CONGESTIVE HEART FAILURE (HCC): ICD-10-CM

## 2020-09-19 DIAGNOSIS — I10 ESSENTIAL HYPERTENSION: ICD-10-CM

## 2020-09-21 RX ORDER — LISINOPRIL 40 MG/1
TABLET ORAL
Qty: 90 TABLET | Refills: 3 | Status: SHIPPED | OUTPATIENT
Start: 2020-09-21

## 2020-10-30 ENCOUNTER — TELEPHONE (OUTPATIENT)
Dept: CARDIOLOGY | Facility: CLINIC | Age: 70
End: 2020-10-30

## 2020-10-30 NOTE — TELEPHONE ENCOUNTER
----- Message from Yemi Menjivar MD sent at 10/30/2020  2:35 PM CDT -----  Please let him know that I reviewed his BMP.  His renal function is at baseline.  His potassium is within normal limits and improved slightly compared to previous.

## 2020-12-28 DIAGNOSIS — E87.5 HYPERKALEMIA: Primary | ICD-10-CM

## 2022-06-28 NOTE — PROGRESS NOTES
Left voicemail re: normal BMP and to call if any problems or questions. Encouraged him to not wait for next appt if having any problems. · Prior hx of hypercalcemia per pt report   · Would not recommend tums at this time or multivitamin   · PTH normal  · Recommend outpt f/u, pt aware